# Patient Record
Sex: MALE | Race: BLACK OR AFRICAN AMERICAN | NOT HISPANIC OR LATINO | Employment: FULL TIME | URBAN - METROPOLITAN AREA
[De-identification: names, ages, dates, MRNs, and addresses within clinical notes are randomized per-mention and may not be internally consistent; named-entity substitution may affect disease eponyms.]

---

## 2018-07-12 ENCOUNTER — HOSPITAL ENCOUNTER (EMERGENCY)
Facility: HOSPITAL | Age: 58
Discharge: HOME | End: 2018-07-12
Attending: EMERGENCY MEDICINE | Admitting: EMERGENCY MEDICINE

## 2018-07-12 VITALS
HEIGHT: 69 IN | BODY MASS INDEX: 46.65 KG/M2 | HEART RATE: 95 BPM | DIASTOLIC BLOOD PRESSURE: 108 MMHG | OXYGEN SATURATION: 98 % | WEIGHT: 315 LBS | TEMPERATURE: 98.5 F | RESPIRATION RATE: 18 BRPM | SYSTOLIC BLOOD PRESSURE: 165 MMHG

## 2018-07-12 DIAGNOSIS — S39.012A ACUTE MYOFASCIAL STRAIN OF LUMBOSACRAL REGION, INITIAL ENCOUNTER: Primary | ICD-10-CM

## 2018-07-12 PROCEDURE — 99282 EMERGENCY DEPT VISIT SF MDM: CPT | Performed by: EMERGENCY MEDICINE

## 2018-07-12 PROCEDURE — 99282 EMERGENCY DEPT VISIT SF MDM: CPT

## 2018-07-12 RX ORDER — CYCLOBENZAPRINE HCL 10 MG
TABLET ORAL
Qty: 20 TABLET | Refills: 0 | Status: SHIPPED | OUTPATIENT
Start: 2018-07-12 | End: 2020-02-17

## 2018-07-12 RX ORDER — LIDOCAINE 50 MG/G
PATCH TOPICAL
Qty: 5 PATCH | Refills: 0 | Status: SHIPPED | OUTPATIENT
Start: 2018-07-12 | End: 2020-02-17

## 2018-07-12 NOTE — ED PROVIDER NOTES
EMERGENCY DEPARTMENT ENCOUNTER      Room Number: 1B/1B      HPI:    Chief complaint: Acute low back pain    Location: Across lower back and into right but talk    Quality/Severity:  Moderate    Timing/Duration: Abrupt onset at 1830    Modifying Factors: Worse with certain movement    Associated Symptoms: No focal numbness or weakness, incontinence or hematuria.    Narrative: Pt is a 57 y.o. male who presents complaining of acute low back pain after reaching abruptly to catch a falling infant in his carrier.  Occurred at work.  No blunt trauma to the back      PMD: Miriam Mercado Reyes, MD    REVIEW OF SYSTEMS  Review of Systems  All other systems reviewed are otherwise negative as related chief complaint  PAST MEDICAL HISTORY  Active Ambulatory Problems     Diagnosis Date Noted   • No Active Ambulatory Problems     Resolved Ambulatory Problems     Diagnosis Date Noted   • No Resolved Ambulatory Problems     Past Medical History:   Diagnosis Date   • Diabetes (CMS/[facility])    • Hypertension        PAST SURGICAL HISTORY  No past surgical history on file.    FAMILY HISTORY  Family History   Problem Relation Age of Onset   • Hypertension Mother    • Diabetes Mother    • Hypertension Father    • Diabetes Father        SOCIAL HISTORY  Social History     Social History   • Marital status: Unknown     Spouse name: N/A   • Number of children: N/A   • Years of education: N/A     Occupational History   • Not on file.     Social History Main Topics   • Smoking status: Never Smoker   • Smokeless tobacco: Never Used   • Alcohol use Yes      Comment: OCC   • Drug use: No   • Sexual activity: Defer     Other Topics Concern   • Not on file     Social History Narrative   • No narrative on file       ALLERGIES  Penicillins    PHYSICAL EXAM  ED Triage Vitals [07/12/18 0124]   Temp Heart Rate Resp BP SpO2   98.5 °F (36.9 °C) 95 18 (!) 165/108 98 %      Temp src Heart Rate Source Patient Position BP Location FiO2 (%)   Oral Monitor Sitting  Left arm --         Physical Exam   Constitutional: He is oriented to person, place, and time and well-developed, well-nourished, and in no distress. No distress.   Cardiovascular: Normal rate and intact distal pulses.    Pulmonary/Chest: Effort normal and breath sounds normal. No respiratory distress.   Abdominal: Soft. There is no tenderness.   Musculoskeletal:        Back:    Moves all extremities equally.  No focal numbness or weakness reported.  Stable gait.   Neurological: He is alert and oriented to person, place, and time. Gait normal. GCS score is 15.   Skin: Skin is warm and dry.   Psychiatric: Mood and affect normal.   Vitals reviewed.      LAB RESULTS  No results found for this or any previous visit.      I ordered the above labs and reviewed the results    RADIOLOGY  No results found.    I ordered the above radiologic testing and reviewed the results    PROCEDURES  Procedures      PROGRESS AND CONSULTS           MEDICAL DECISION MAKING  Results were reviewed/discussed with the patient and they were also made aware of online access. Pt also made aware that some labs, such as cultures, will not be resulted during ER visit and follow up with PMD is necessary.     MDM       DIAGNOSIS  Final diagnoses:   Acute myofascial strain of lumbosacral region, initial encounter       Latest Documented Vital Signs:  As of 1:38 AM  BP- (!) 165/108 HR- 95 Temp- 98.5 °F (36.9 °C) (Oral) O2 sat- 98%    DISPOSITION  Discharged in stable condition       Medication List      New Prescriptions    cyclobenzaprine 10 MG tablet  Commonly known as:  FLEXERIL  Take one tablet by mouth up to 3 times daily as needed for muscle spasms     lidocaine 5 %  Commonly known as:  LIDODERM  Place 1 patch on the skin every day overlying the area of discomfort.   Remove & Discard patch within 12 hours          Follow-up Information     Miriam Mercado Reyes, MD. Schedule an appointment as soon as possible for a visit in 1 week.    Specialty:   Family Medicine  Why:  As needed, If symptoms fail to improve  Contact information:  [address] [facility] [address]  [phone]                        Av Lubin MD  07/12/18 0139

## 2020-02-17 ENCOUNTER — APPOINTMENT (OUTPATIENT)
Dept: GENERAL RADIOLOGY | Facility: HOSPITAL | Age: 60
End: 2020-02-17

## 2020-02-17 ENCOUNTER — HOSPITAL ENCOUNTER (EMERGENCY)
Facility: HOSPITAL | Age: 60
Discharge: HOME | End: 2020-02-17
Attending: EMERGENCY MEDICINE | Admitting: EMERGENCY MEDICINE

## 2020-02-17 VITALS
BODY MASS INDEX: 46.65 KG/M2 | OXYGEN SATURATION: 97 % | HEIGHT: 69 IN | TEMPERATURE: 98.1 F | WEIGHT: 315 LBS | HEART RATE: 92 BPM | SYSTOLIC BLOOD PRESSURE: 160 MMHG | DIASTOLIC BLOOD PRESSURE: 85 MMHG | RESPIRATION RATE: 20 BRPM

## 2020-02-17 DIAGNOSIS — S63.502A SPRAIN OF LEFT WRIST, INITIAL ENCOUNTER: Primary | ICD-10-CM

## 2020-02-17 DIAGNOSIS — S39.012A STRAIN OF LUMBAR REGION, INITIAL ENCOUNTER: ICD-10-CM

## 2020-02-17 DIAGNOSIS — S80.02XA CONTUSION OF LEFT KNEE, INITIAL ENCOUNTER: ICD-10-CM

## 2020-02-17 PROCEDURE — 72110 X-RAY EXAM L-2 SPINE 4/>VWS: CPT

## 2020-02-17 PROCEDURE — 73562 X-RAY EXAM OF KNEE 3: CPT

## 2020-02-17 PROCEDURE — 99284 EMERGENCY DEPT VISIT MOD MDM: CPT

## 2020-02-17 PROCEDURE — 99282 EMERGENCY DEPT VISIT SF MDM: CPT | Performed by: PHYSICIAN ASSISTANT

## 2020-02-17 PROCEDURE — 73110 X-RAY EXAM OF WRIST: CPT

## 2020-02-17 PROCEDURE — 73130 X-RAY EXAM OF HAND: CPT

## 2020-02-17 RX ORDER — OMEPRAZOLE 20 MG/1
20 CAPSULE, DELAYED RELEASE ORAL DAILY
COMMUNITY
End: 2021-12-03

## 2020-02-17 RX ORDER — ACETAMINOPHEN 500 MG
1000 TABLET ORAL ONCE
Status: COMPLETED | OUTPATIENT
Start: 2020-02-17 | End: 2020-02-17

## 2020-02-17 RX ORDER — FUROSEMIDE 40 MG/1
40 TABLET ORAL 2 TIMES DAILY
COMMUNITY
End: 2021-05-20

## 2020-02-17 RX ORDER — CHLORAL HYDRATE 500 MG
CAPSULE ORAL
COMMUNITY

## 2020-02-17 RX ADMIN — ACETAMINOPHEN 1000 MG: 500 TABLET, FILM COATED ORAL at 14:29
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
## 2020-08-25 ENCOUNTER — HOSPITAL ENCOUNTER (OUTPATIENT)
Dept: PHYSICAL THERAPY | Facility: HOSPITAL | Age: 60
Setting detail: THERAPIES SERIES
Discharge: HOME | End: 2020-08-25

## 2020-08-25 DIAGNOSIS — S83.91XD SPRAIN OF RIGHT KNEE, UNSPECIFIED LIGAMENT, SUBSEQUENT ENCOUNTER: Primary | ICD-10-CM

## 2020-08-25 PROCEDURE — G0283 ELEC STIM OTHER THAN WOUND: HCPCS | Performed by: PHYSICAL THERAPIST

## 2020-08-25 PROCEDURE — 97110 THERAPEUTIC EXERCISES: CPT | Performed by: PHYSICAL THERAPIST

## 2020-08-25 PROCEDURE — 97162 PT EVAL MOD COMPLEX 30 MIN: CPT | Performed by: PHYSICAL THERAPIST

## 2020-08-25 NOTE — THERAPY EVALUATION
Outpatient Physical Therapy Ortho Initial Evaluation  DINESH Carrasco     Patient Name: Diego Ribeiro  : 1960  MRN: [id number]  Today's Date: 2020      Visit Date: 2020    There is no problem list on file for this patient.       Past Medical History:   Diagnosis Date   • Diabetes (CMS/HCC)    • Hypertension         History reviewed. No pertinent surgical history.    Visit Dx:     ICD-10-CM ICD-9-CM   1. Sprain of right knee, unspecified ligament, subsequent encounter S83.91XD V58.89     844.9         Patient History     Row Name 20 0900             History    Chief Complaint  Pain  -SP      Type of Pain  Knee pain  -SP      Date Current Problem(s) Began  20  -SP      Brief Description of Current Complaint  Patient injured right knee when he was working on a dock at work while going to step up.  He states he hit hands first then knees.  Patient has had ongoing pain and difficulty with weight bearing activity.  He had x-ray of right knee with no abnormalities. Patient reports that he has continued to have pain in medial aspect of knee with buckling.  He was given a knee support and states that he is unable to walk without it.    -SP      Previous treatment for THIS PROBLEM  Medication knee sleeve  -SP      Patient/Caregiver Goals  Relieve pain;Return to work;Improve mobility  -SP      Patient's Rating of General Health  Good  -SP      Occupation/sports/leisure activities  Works full time at [employer].  Requires prolonged stand and walking on unlevel surfaces and occasional stairs.   -SP      How has patient tried to help current problem?  lidocaine patch, knee support, naproxen  -SP      What clinical tests have you had for this problem?  X-ray  -SP         Pain     Pain Location  Knee  -SP      Pain at Present  4  -SP      Pain at Best  0  -SP      Pain at Worst  10  -SP      Pain Frequency  Constant/continuous occasional period with no pain  -SP      Pain Description   Shooting;Sharp;Throbbing  -SP      What Performance Factors Make the Current Problem(s) WORSE?  weight bearing, prolonged walking, standing, stairs, transfers   -SP      What Performance Factors Make the Current Problem(s) BETTER?  rest, ice, meds, lidocaine patch.   -SP      Tolerance Time- Standing  limited tolerance; pain upon initial stand  -SP      Tolerance Time- Sitting  becomes uncomfortable with prolonged sit  -SP      Tolerance Time- Walking  difficulty tolerating prolonged   -SP      Is your sleep disturbed?  Yes  -SP      Difficulties at work?  currently unable to perform work duties  -SP      Difficulties with ADL's?  difficutly with transfers, gait especially on unlevel surfaces, limited tolerance for weight bearing ADLs  -SP         Fall Risk Assessment    Any falls in the past year:  Yes  -SP         Daily Activities    Primary Language  English  -SP      How does patient learn best?  Listening;Reading  -SP      Teaching needs identified  Home Exercise Program;Management of Condition  -SP      Patient is concerned about/has problems with  Transfers (getting out of a chair, bed);Walking;Standing  -SP      Barriers to learning  None  -SP      Pt Participated in POC and Goals  Yes  -SP         Safety    Are you being hurt, hit, or frightened by anyone at home or in your life?  No  -SP      Are you being neglected by a caregiver  No  -SP        User Key  (r) = Recorded By, (t) = Taken By, (c) = Cosigned By    Initials Name Provider Type    SP Maye Cabrera, PT Physical Therapist          PT Ortho     Row Name 08/25/20 0900       Posture/Observations    Observations  Edema  -SP    Posture/Observations Comments  moderate edema right knee anterior and along joint line:  with with knee sleev yadiel  -SP       Knee Palpation    Patella  Right:;Tender;Swollen  -SP    Patella Tendon  Right:;Tender;Swollen  -SP    Prepatellar Bursa  Right:;Tender;Swollen  -SP    Suprapatella Bursa   Right:;Tender;Swollen  -SP    Medial Joint Line  Right:;Tender;Swollen  -SP    Lateral Joint Line  Right:;Tender;Swollen  -SP    Posterior Joint Line  Right:;Tender  -SP    Quads  Right:;Tender  -SP    Biceps Femoris  Right:;Tender  -SP    Semimembranosis  Right:;Tender  -SP       Patellar Accessory Motions    Superior glide  Right:;Hypomobile  -SP    Inferior glide  Right:;Hypomobile  -SP    Medial glide  Right:;Hypomobile  -SP    Lateral glide  Right:;Hypomobile  -SP       Knee Special Tests    Knee Special Tests Comments  patient guarded with attempts to perform special tests  -SP       General ROM    GENERAL ROM COMMENTS  right hip and ankle AROM wfl  -SP       Right Lower Ext    Rt Knee Extension/Flexion AROM  10 to 104 degrees with pain at end ranges  -SP       MMT Right Lower Ext    Rt Hip Flexion MMT, Gross Movement  (4-/5) good minus  -SP    Rt Hip ABduction MMT, Gross Movement  (4-/5) good minus  -SP    Rt Hip ADduction MMT, Gross Movement  (4-/5) good minus  -SP    Rt Knee Extension MMT, Gross Movement  (3/5) fair  -SP    Rt Knee Flexion MMT, Gross Movement  (3+/5) fair plus  -SP    Rt Lower Extremity Comments   pain limited strength tests  -SP       Sensation    Sensation WNL?  WFL  -SP       Lower Extremity Flexibility    Hamstrings  Right:;Moderately limited  -SP    Hip Flexors  Right:;Moderately limited  -SP    Gastrocnemius  Right:;Moderately limited  -SP       RLE Quick Girth (cm)    Tib tuberosity  46.7 cm  -SP    Mid patella  51 cm  -SP    Distal thigh  54.3 cm  -SP    RLE Quick Girth Total  152  -SP       Balance Skills Training    SLS  unable to single leg stand on right without UE support  -SP       Transfers    Bed-Chair Apex (Transfers)  independent  -SP    Chair-Bed Apex (Transfers)  independent  -SP    Sit-Stand Apex (Transfers)  independent  -SP    Stand-Sit Apex (Transfers)  independent  -SP    Comment (Transfers)  Patient observed to have antalgic  transfers sit to stand with use of UEs to assist   -SP       Gait/Stairs Assessment/Training    [place] Level (Gait)  independent  -SP    Pattern (Gait)  swing-through  -SP    Deviations/Abnormal Patterns (Gait)  antalgic;gait speed decreased;stride length decreased  -SP    Bilateral Gait Deviations  forward flexed posture  -SP    Right Sided Gait Deviations  heel strike decreased right knee remains slightly flexed throughout   -SP    Ascending Technique (Stairs)  step-over-step  -SP    Descending Technique (Stairs)  step-over-step  -SP    Comment (Gait/Stairs)  requires hold to rail with stairs  -SP      User Key  (r) = Recorded By, (t) = Taken By, (c) = Cosigned By    Initials Name Provider Type    Maye Barnes, PT Physical Therapist                      Therapy Education  Given: HEP  Program: New  How Provided: Verbal, Written  Provided to: Patient  Level of Understanding: Verbalized, Demonstrated     PT OP Goals     Row Name 08/25/20 0900          PT Short Term Goals    STG Date to Achieve  09/24/20  -SP     STG 1  Patient demonstrates right knee extension to 0 degrees to allow heel strke with gait  -SP     STG 2  Patient ambulates level surfaces with normal gait pattern and equal weight bearing bilaterally  -SP     STG 3  Patient exhibits decreased edema/girth measures by 0.5 cm to improve mobility and decrease pain,  -SP        Long Term Goals    LTG Date to Achieve  09/24/20  -SP     LTG 1  Patient demonstrates right knee AROM 0 to 115 degrees without complaints of pain at end ranges  -SP     LTG 2  Patient demonstrates increased strength right LE by one muscle grade to better tolerate ADLs  -SP     LTG 3  Patient tolerates work duties with pain level <2/10 at worst   -SP     LTG 4  Patient independent with HEP  -SP        Time Calculation    PT Goal Re-Cert Due Date  09/24/20  -SP       User Key  (r) = Recorded By, (t) = Taken By, (c) = Cosigned By    Initials Name Provider Type    SP  Maye Cabrera, PT Physical Therapist          PT Assessment/Plan     Row Name 08/25/20 1359          PT Assessment    Functional Limitations  Impaired gait;Limitation in home management;Limitations in community activities;Performance in work activities;Performance in self-care ADL;Performance in leisure activities  -SP     Impairments  Gait;Edema;Endurance;Range of motion;Pain;Muscle strength;Impaired flexibility  -SP     Assessment Comments  Patient injured when he fell onto knees while working at [employer] 6-30-20, resulting in right knee pain and reports of instability.  Patient presents with pain, decreased ROM, edema, pain limited weakness, decreased tolerance for weight bearing and impaired functional ability to perfrom home, work, and community ADLsl  -SP     Please refer to paper survey for additional self-reported information  Yes  -SP     Rehab Potential  Good  -SP     Patient/caregiver participated in establishment of treatment plan and goals  Yes  -SP     Patient would benefit from skilled therapy intervention  Yes  -SP        PT Plan    PT Frequency  2x/week  -SP     Predicted Duration of Therapy Intervention (Therapy Eval)  2-3 weeks  -SP     Planned CPT's?  PT EVAL MOD COMPLELITY: 62407;PT MANUAL THERAPY EA 15 MIN: 49532;PT HOT OR COLD PACK TREAT MCARE;PT HOT/COLD PACK WC NONMCARE: 61542;PT THER PROC EA 15 MIN: 96617;PT ELECTRICAL STIM UNATTEND:   -SP       User Key  (r) = Recorded By, (t) = Taken By, (c) = Cosigned By    Initials Name Provider Type    SP Maye Cabrera, PT Physical Therapist          Modalities     Row Name 08/25/20 0900             Ice    Ice Applied  Yes with IFC  -SP      Location  right knee  -SP      Rx Minutes  15 mins  -SP      Ice S/P Rx  Yes  -SP         ELECTRICAL STIMULATION    Attended/Unattended  Unattended  -SP      Stimulation Type  IFC  -SP      Location/Electrode Placement/Other  right knee  -SP        User Key  (r) = Recorded By, (t) =  Taken By, (c) = Cosigned By    Initials Name Provider Type    Maye Barnes, HILARIO Physical Therapist        OP Exercises     Row Name 08/25/20 0900             Exercise 1    Exercise Name 1  quad set on towel roll  -SP      Cueing 1  Verbal;Tactile  -SP      Reps 1  10   -SP      Time 1  5 sec  -SP         Exercise 2    Exercise Name 2  SLR  -SP      Cueing 2  Verbal;Tactile  -SP      Reps 2  15  -SP         Exercise 3    Exercise Name 3  SAQ  -SP      Cueing 3  Verbal;Tactile  -SP      Reps 3  20  -SP         Exercise 4    Exercise Name 4  hamstring stretch  -SP      Cueing 4  Verbal;Tactile  -SP      Reps 4  3  -SP      Time 4  20 sec  -SP        User Key  (r) = Recorded By, (t) = Taken By, (c) = Cosigned By    Initials Name Provider Type    Maye Barnes, HILARIO Physical Therapist                        Outcome Measure Options: Lower Extremity Functional Scale (LEFS)  Lower Extremity Functional Index  Any of your usual work, housework or school activities: Quite a bit of difficulty  Your usual hobbies, recreational or sporting activities: Quite a bit of difficulty  Getting into or out of the bath: Moderate difficulty  Walking between rooms: Moderate difficulty  Putting on your shoes or socks: Moderate difficulty  Squatting: Quite a bit of difficulty  Lifting an object, like a bag of groceries from the floor: Moderate difficulty  Performing light activities around your home: Moderate difficulty  Performing heavy activities around your home: Quite a bit of difficulty  Getting into or out of a car: Moderate difficulty  Walking 2 blocks: Quite a bit of difficulty  Walking a mile: Quite a bit of difficulty  Going up or down 10 stairs (about 1 flight of stairs): Moderate difficulty  Standing for 1 hour: Quite a bit of difficulty  Sitting for 1 hour: A little bit of difficulty  Running on even ground: Quite a bit of difficulty  Running on uneven ground: Quite a bit of difficulty  Making sharp turns  while running fast: Quite a bit of difficulty  Hopping: Quite a bit of difficulty  Rolling over in bed: Moderate difficulty  Total: 30      Time Calculation:     Start Time: 0900  Stop Time: 1000  Time Calculation (min): 60 min     Therapy Charges for Today     Code Description Service Date Service Provider Modifiers Qty    32715440869 HC PT ELECTRICAL STIM UNATTENDED 8/25/2020 Maye Cabrera, PT  1    94226646268 HC PT THER PROC EA 15 MIN 8/25/2020 Maye Cabrera, PT GP 1    74190421523 HC PT EVAL MOD COMPLEXITY 2 8/25/2020 Maye Cabrera, PT GP 1          PT G-Codes  Outcome Measure Options: Lower Extremity Functional Scale (LEFS)  Total: 30         Maye Cabrera, PT  8/25/2020

## 2020-08-27 ENCOUNTER — HOSPITAL ENCOUNTER (OUTPATIENT)
Dept: PHYSICAL THERAPY | Facility: HOSPITAL | Age: 60
Setting detail: THERAPIES SERIES
Discharge: HOME | End: 2020-08-27

## 2020-08-27 DIAGNOSIS — S83.91XD SPRAIN OF RIGHT KNEE, UNSPECIFIED LIGAMENT, SUBSEQUENT ENCOUNTER: Primary | ICD-10-CM

## 2020-08-27 PROCEDURE — 97110 THERAPEUTIC EXERCISES: CPT | Performed by: PHYSICAL THERAPIST

## 2020-08-27 PROCEDURE — G0283 ELEC STIM OTHER THAN WOUND: HCPCS | Performed by: PHYSICAL THERAPIST

## 2020-08-27 NOTE — THERAPY TREATMENT NOTE
Outpatient Physical Therapy Ortho Treatment Note  DINESH Carrasco     Patient Name: Diego Ribeiro  : 1960  MRN: [id number]  Today's Date: 2020      Visit Date: 2020    Visit Dx:    ICD-10-CM ICD-9-CM   1. Sprain of right knee, unspecified ligament, subsequent encounter S83.91XD V58.89     844.9       There is no problem list on file for this patient.       Past Medical History:   Diagnosis Date   • Diabetes (CMS/HCC)    • Hypertension         No past surgical history on file.    PT Ortho     Row Name 20 0708       Subjective Comments    Subjective Comments  Patient reports that he had mild soreness after last visit.  He has tried exercises at home.   -SP      User Key  (r) = Recorded By, (t) = Taken By, (c) = Cosigned By    Initials Name Provider Type    Maye Barnes, PT Physical Therapist                      PT Assessment/Plan     Row Name 20 0994          PT Assessment    Assessment Comments  Patient with fair tolerance for progression of ther-ex.  He does report increased pain with exercise but does have some relief of symptoms with modalities  -SP        PT Plan    PT Plan Comments  Continue to progress as tolerable   -SP       User Key  (r) = Recorded By, (t) = Taken By, (c) = Cosigned By    Initials Name Provider Type    Maey Barnes, PT Physical Therapist          Modalities     Row Name 20 0708             Ice    Ice Applied  Yes IFC  -SP      Location  right knee  -SP      Rx Minutes  15 mins  -SP      Ice S/P Rx  Yes  -SP         ELECTRICAL STIMULATION    Attended/Unattended  Unattended  -SP      Stimulation Type  IFC  -SP      Location/Electrode Placement/Other  right knee  -SP        User Key  (r) = Recorded By, (t) = Taken By, (c) = Cosigned By    Initials Name Provider Type    Maye Barnes, PT Physical Therapist        OP Exercises     Row Name 20 0733             Subjective Comments    Subjective Comments  Patient  reports that he had mild soreness after last visit.  He has tried exercises at home.   -SP         Exercise 1    Exercise Name 1  quad set on towel roll  -SP      Cueing 1  Verbal;Tactile  -SP      Reps 1  10   -SP      Time 1  5 sec  -SP         Exercise 2    Exercise Name 2  SLR  -SP      Cueing 2  Verbal;Tactile  -SP      Reps 2  20  -SP         Exercise 3    Exercise Name 3  SAQ  -SP      Cueing 3  Verbal;Tactile  -SP      Reps 3  20  -SP         Exercise 4    Exercise Name 4  hamstring stretch  -SP      Cueing 4  Verbal;Tactile  -SP      Reps 4  3  -SP      Time 4  20 sec  -SP         Exercise 5    Exercise Name 5  sidelying hip abduction  -SP      Cueing 5  Verbal;Tactile  -SP      Reps 5  20  -SP         Exercise 6    Exercise Name 6  hip adduction/ball squeeze  -SP      Cueing 6  Verbal;Tactile  -SP      Reps 6  20  -SP      Time 6  5 sec  -SP         Exercise 7    Exercise Name 7  recumbent bike (seat 9)  -SP      Cueing 7  Verbal  -SP      Time 7  5 min  -SP        User Key  (r) = Recorded By, (t) = Taken By, (c) = Cosigned By    Initials Name Provider Type    Maye Barnes, PT Physical Therapist                           Therapy Education  Given: HEP  Program: Reinforced, Progressed  How Provided: Verbal, Written  Provided to: Patient  Level of Understanding: Verbalized, Demonstrated              Time Calculation:   Start Time: 0708  Stop Time: 0805  Time Calculation (min): 57 min  Therapy Charges for Today     Code Description Service Date Service Provider Modifiers Qty    51975972045 HC PT ELECTRICAL STIM UNATTENDED 8/27/2020 Maye Cabrera, PT  1    91053352769 HC PT THER PROC EA 15 MIN 8/27/2020 Maye Cabrera, PT GP 2                    Maye Cabrera, PT  8/27/2020

## 2020-08-31 ENCOUNTER — HOSPITAL ENCOUNTER (OUTPATIENT)
Dept: PHYSICAL THERAPY | Facility: HOSPITAL | Age: 60
Setting detail: THERAPIES SERIES
Discharge: HOME | End: 2020-08-31

## 2020-08-31 DIAGNOSIS — S83.91XD SPRAIN OF RIGHT KNEE, UNSPECIFIED LIGAMENT, SUBSEQUENT ENCOUNTER: Primary | ICD-10-CM

## 2020-08-31 PROCEDURE — G0283 ELEC STIM OTHER THAN WOUND: HCPCS | Performed by: PHYSICAL THERAPIST

## 2020-08-31 PROCEDURE — 97110 THERAPEUTIC EXERCISES: CPT | Performed by: PHYSICAL THERAPIST

## 2020-09-08 ENCOUNTER — HOSPITAL ENCOUNTER (OUTPATIENT)
Dept: PHYSICAL THERAPY | Facility: HOSPITAL | Age: 60
Setting detail: THERAPIES SERIES
Discharge: HOME | End: 2020-09-08

## 2020-09-08 DIAGNOSIS — S83.91XD SPRAIN OF RIGHT KNEE, UNSPECIFIED LIGAMENT, SUBSEQUENT ENCOUNTER: Primary | ICD-10-CM

## 2020-09-08 PROCEDURE — G0283 ELEC STIM OTHER THAN WOUND: HCPCS | Performed by: PHYSICAL THERAPIST

## 2020-09-08 PROCEDURE — 97110 THERAPEUTIC EXERCISES: CPT | Performed by: PHYSICAL THERAPIST

## 2020-09-08 NOTE — THERAPY TREATMENT NOTE
Outpatient Physical Therapy Ortho Treatment Note  DINESH Carrasco     Patient Name: Diego Ribeiro  : 1960  MRN: [id number]  Today's Date: 2020      Visit Date: 2020    Visit Dx:    ICD-10-CM ICD-9-CM   1. Sprain of right knee, unspecified ligament, subsequent encounter S83.91XD V58.89     844.9       There is no problem list on file for this patient.       Past Medical History:   Diagnosis Date   • Diabetes (CMS/HCC)    • Hypertension         No past surgical history on file.    PT Ortho     Row Name 20 0700       Subjective Comments    Subjective Comments  Patient reports that his knee is no better and actually seems worse.  He states that he is noticing more bruising and he is not sure if it is from his brace.  Patient reports symptoms have definitely been worse over the last few days.    -SP      User Key  (r) = Recorded By, (t) = Taken By, (c) = Cosigned By    Initials Name Provider Type    Maye Barnes, PT Physical Therapist                      PT Assessment/Plan     Row Name 20 0802          PT Assessment    Assessment Comments  Patient continues to have elevated pain level and reports instability when not wearing sleeve  -SP        PT Plan    PT Plan Comments  Continue to progress as tolerable  -SP       User Key  (r) = Recorded By, (t) = Taken By, (c) = Cosigned By    Initials Name Provider Type    Maye Barnes, PT Physical Therapist          Modalities     Row Name 20 0700             Ice    Ice Applied  Yes  -SP      Location  right knee  -SP      Rx Minutes  15 mins  -SP      Ice S/P Rx  Yes  -SP         ELECTRICAL STIMULATION    Attended/Unattended  Unattended  -SP      Stimulation Type  IFC  -SP      Location/Electrode Placement/Other  right knee  -SP        User Key  (r) = Recorded By, (t) = Taken By, (c) = Cosigned By    Initials Name Provider Type    Maye Barnes, PT Physical Therapist        OP Exercises     Row Name  09/08/20 0700             Subjective Comments    Subjective Comments  Patient reports that his knee is no better and actually seems worse.  He states that he is noticing more bruising and he is not sure if it is from his brace.  Patient reports symptoms have definitely been worse over the last few days.    -SP         Exercise 1    Exercise Name 1  quad set on towel roll  -SP      Cueing 1  Verbal;Tactile  -SP      Reps 1  20  -SP      Time 1  5 sec  -SP         Exercise 2    Exercise Name 2  SLR  -SP      Cueing 2  Verbal;Tactile  -SP      Reps 2  25  -SP         Exercise 3    Exercise Name 3  SAQ  -SP      Cueing 3  Verbal;Tactile  -SP      Reps 3  25  -SP         Exercise 4    Exercise Name 4  hamstring stretch  -SP      Cueing 4  Verbal;Tactile  -SP      Reps 4  3  -SP      Time 4  20 sec  -SP         Exercise 5    Exercise Name 5  sidelying hip abduction  -SP      Cueing 5  Verbal;Tactile  -SP      Reps 5  25  -SP         Exercise 6    Exercise Name 6  hip adduction/ball squeeze  -SP      Cueing 6  Verbal;Tactile  -SP      Reps 6  25  -SP      Time 6  5 sec  -SP         Exercise 7    Exercise Name 7  recumbent bike (seat 9)  -SP      Cueing 7  Verbal  -SP      Time 7  7 min  -SP        User Key  (r) = Recorded By, (t) = Taken By, (c) = Cosigned By    Initials Name Provider Type    Maye Barnes, HILARIO Physical Therapist                           Therapy Education  Given: HEP  Program: Reinforced  How Provided: Verbal  Provided to: Patient  Level of Understanding: Verbalized, Demonstrated              Time Calculation:   Start Time: 0720  Stop Time: 0816  Time Calculation (min): 56 min  Therapy Charges for Today     Code Description Service Date Service Provider Modifiers Qty    33324813874 HC PT THER PROC EA 15 MIN 9/8/2020 Maye Cabrera, PT GP 1    11185782380 HC PT ELECTRICAL STIM UNATTENDED 9/8/2020 Maye Cabrera, PT  1                    Maye Cabrera,  PT  9/8/2020

## 2020-09-09 ENCOUNTER — TRANSCRIBE ORDERS (OUTPATIENT)
Dept: ADMINISTRATIVE | Facility: HOSPITAL | Age: 60
End: 2020-09-09

## 2020-09-09 DIAGNOSIS — M25.561 RIGHT KNEE PAIN, UNSPECIFIED CHRONICITY: Primary | ICD-10-CM

## 2020-09-14 ENCOUNTER — HOSPITAL ENCOUNTER (OUTPATIENT)
Dept: PHYSICAL THERAPY | Facility: HOSPITAL | Age: 60
Setting detail: THERAPIES SERIES
Discharge: HOME | End: 2020-09-14

## 2020-09-14 DIAGNOSIS — S83.91XD SPRAIN OF RIGHT KNEE, UNSPECIFIED LIGAMENT, SUBSEQUENT ENCOUNTER: Primary | ICD-10-CM

## 2020-09-14 PROCEDURE — G0283 ELEC STIM OTHER THAN WOUND: HCPCS | Performed by: PHYSICAL THERAPIST

## 2020-09-14 PROCEDURE — 97110 THERAPEUTIC EXERCISES: CPT | Performed by: PHYSICAL THERAPIST

## 2020-09-14 NOTE — THERAPY TREATMENT NOTE
Outpatient Physical Therapy Ortho Treatment Note  DINESH Carrasco     Patient Name: Diego Ribeiro  : 1960  MRN: [id number]  Today's Date: 2020      Visit Date: 2020    Visit Dx:    ICD-10-CM ICD-9-CM   1. Sprain of right knee, unspecified ligament, subsequent encounter  S83.91XD V58.89     844.9       There is no problem list on file for this patient.       Past Medical History:   Diagnosis Date   • Diabetes (CMS/HCC)    • Hypertension         No past surgical history on file.    PT Ortho     Row Name 20 07       Subjective Comments    Subjective Comments  Patient reports that he is having less pain and discomfort in right knee.    -SP      User Key  (r) = Recorded By, (t) = Taken By, (c) = Cosigned By    Initials Name Provider Type    Maye Barnes, PT Physical Therapist                      PT Assessment/Plan     Row Name 20 0745          PT Assessment    Assessment Comments  Patient with decreasing symptoms.  He tolerates progression of ther-ex well without complaints of increased pain.  -SP        PT Plan    PT Plan Comments  Continue to progress per POC  -SP       User Key  (r) = Recorded By, (t) = Taken By, (c) = Cosigned By    Initials Name Provider Type    Maye Barnes, PT Physical Therapist          Modalities     Row Name 20             Ice    Ice Applied  Yes  -SP      Location  right knee  -SP      Rx Minutes  15 mins  -SP      Ice S/P Rx  Yes  -SP         ELECTRICAL STIMULATION    Attended/Unattended  Unattended  -SP      Stimulation Type  IFC  -SP      Location/Electrode Placement/Other  right knee  -SP        User Key  (r) = Recorded By, (t) = Taken By, (c) = Cosigned By    Initials Name Provider Type    Maye Barnes, PT Physical Therapist        OP Exercises     Row Name 20 07             Subjective Comments    Subjective Comments  Patient reports that he is having less pain and discomfort in right knee.     -SP         Exercise 1    Exercise Name 1  quad set on towel roll  -SP      Cueing 1  Verbal;Tactile  -SP      Reps 1  20  -SP      Time 1  5 sec  -SP         Exercise 2    Exercise Name 2  SLR  -SP      Cueing 2  Verbal;Tactile  -SP      Reps 2  30  -SP         Exercise 3    Exercise Name 3  SAQ  -SP      Cueing 3  Verbal;Tactile  -SP      Reps 3  30  -SP         Exercise 4    Exercise Name 4  hamstring stretch  -SP      Cueing 4  Verbal;Tactile  -SP      Reps 4  3  -SP      Time 4  20 sec  -SP         Exercise 5    Exercise Name 5  sidelying hip abduction  -SP      Cueing 5  Verbal;Tactile  -SP      Reps 5  30  -SP         Exercise 6    Exercise Name 6  hip adduction/ball squeeze  -SP      Cueing 6  Verbal;Tactile  -SP      Reps 6  30  -SP      Time 6  5 sec  -SP         Exercise 7    Exercise Name 7  recumbent bike (seat 9)  -SP      Cueing 7  Verbal  -SP      Time 7  7 min  -SP         Exercise 8    Exercise Name 8  LAQ  -SP      Cueing 8  Verbal;Demo  -SP      Reps 8  20  -SP        User Key  (r) = Recorded By, (t) = Taken By, (c) = Cosigned By    Initials Name Provider Type    SP Maye Cabrera, PT Physical Therapist                           Therapy Education  Given: HEP  Program: Reinforced, Progressed  How Provided: Verbal  Provided to: Patient  Level of Understanding: Verbalized, Demonstrated              Time Calculation:   Start Time: 0718  Stop Time: 0806  Time Calculation (min): 48 min  Therapy Charges for Today     Code Description Service Date Service Provider Modifiers Qty    66428034657 HC PT ELECTRICAL STIM UNATTENDED 9/14/2020 Maye Cabrera, PT  1    28198648148 HC PT THER PROC EA 15 MIN 9/14/2020 Maye Cabrera, PT GP 1                    Maye Cabrera, PT  9/14/2020

## 2020-09-15 ENCOUNTER — HOSPITAL ENCOUNTER (OUTPATIENT)
Dept: MRI IMAGING | Facility: HOSPITAL | Age: 60
Discharge: HOME | End: 2020-09-15
Admitting: NURSE PRACTITIONER

## 2020-09-15 DIAGNOSIS — M25.561 RIGHT KNEE PAIN, UNSPECIFIED CHRONICITY: ICD-10-CM

## 2020-09-15 PROCEDURE — 73721 MRI JNT OF LWR EXTRE W/O DYE: CPT

## 2020-09-28 ENCOUNTER — OFFICE VISIT (OUTPATIENT)
Dept: ORTHOPEDIC SURGERY | Facility: CLINIC | Age: 60
End: 2020-09-28

## 2020-09-28 VITALS
HEART RATE: 90 BPM | DIASTOLIC BLOOD PRESSURE: 83 MMHG | SYSTOLIC BLOOD PRESSURE: 126 MMHG | BODY MASS INDEX: 46.65 KG/M2 | WEIGHT: 315 LBS | HEIGHT: 69 IN

## 2020-09-28 DIAGNOSIS — M17.11 PRIMARY OSTEOARTHRITIS OF RIGHT KNEE: Primary | ICD-10-CM

## 2020-09-28 PROCEDURE — 99203 OFFICE O/P NEW LOW 30 MIN: CPT | Performed by: ORTHOPAEDIC SURGERY

## 2020-09-28 RX ORDER — PIOGLITAZONEHYDROCHLORIDE 30 MG/1
TABLET ORAL
COMMUNITY
Start: 2020-07-13 | End: 2021-12-03

## 2020-09-28 RX ORDER — IBUPROFEN 800 MG/1
TABLET ORAL
COMMUNITY
Start: 2020-09-21 | End: 2020-11-16

## 2020-09-28 NOTE — PROGRESS NOTES
Subjective: Right knee pain     Patient ID: Diego Ribeiro is a 60 y.o. male.    Chief Complaint:    History of Present Illness 60-year-old male is seen for his right knee which he injured at work [employer] on June 30 when he fell forward landed on all 4 extremities injuring the right knee.  Had minimal if any discomfort in the knee prior to this episode but since that time is progressively worsened causing increasing pain and discomfort.  Patient is tried physical therapy and has been taking ibuprofen 800 with marginal relief.  He describes a sharp pain that is anywhere from 2-5 out of 10 with activity.  An MRI was ordered which showed bilateral meniscal tears and degenerative changes in both compartments with an insufficiency fracture of the medial femoral condyle.       Social History     Occupational History   • Not on file   Tobacco Use   • Smoking status: Never Smoker   • Smokeless tobacco: Never Used   Substance and Sexual Activity   • Alcohol use: Yes     Comment: OCC   • Drug use: No   • Sexual activity: Defer      Review of Systems   Constitutional: Negative for chills, diaphoresis, fever and unexpected weight change.   HENT: Negative for hearing loss, nosebleeds, sore throat and tinnitus.    Eyes: Negative for pain and visual disturbance.   Respiratory: Negative for cough, shortness of breath and wheezing.    Cardiovascular: Negative for chest pain and palpitations.   Gastrointestinal: Negative for abdominal pain, diarrhea, nausea and vomiting.   Endocrine: Negative for cold intolerance, heat intolerance and polydipsia.   Genitourinary: Negative for difficulty urinating, dysuria and hematuria.   Musculoskeletal: Positive for myalgias. Negative for arthralgias and joint swelling.   Skin: Negative for rash and wound.   Allergic/Immunologic: Negative for environmental allergies.   Neurological: Negative for dizziness, syncope and numbness.   Hematological: Does not bruise/bleed easily.    Psychiatric/Behavioral: Negative for dysphoric mood and sleep disturbance. The patient is not nervous/anxious.          Past Medical History:   Diagnosis Date   • Diabetes (CMS/HCC)    • Hypertension      History reviewed. No pertinent surgical history.  Family History   Problem Relation Age of Onset   • Hypertension Mother    • Diabetes Mother    • Hypertension Father    • Diabetes Father          Objective:  Vitals:    09/28/20 1408   BP: 126/83   Pulse: 90         09/28/20  1408   Weight: (!) 147 kg (325 lb)     Body mass index is 47.99 kg/m².        Ortho Exam   I reviewed the MRI images and report and plain x-rays which show the degenerative changes.  He is alert and oriented x3.  Has normocephalic sclera.  Right he has a boggy synovitis.  He is 0 to 120 degrees of motion with moderate pain and crepitus with range of motion.  No instability at 09 degrees nor any varus or valgus instability at 10 to 30 degrees.  There is medial joint line tenderness with an equivocal medial Jessy's.  Minimal tenderness laterally.  Quad and hamstring function are 4 and half out of 5 secondary to pain.  His calf is nontender.  He has good distal pulses no motor or sensory deficit skin is cool to touch.    Assessment:        1. Primary osteoarthritis of right knee           Plan: Lengthy discussion with the patient regarding his history his x-rays and MRI and his physical findings.  He has pre-existing arthritis with the meniscal tears which I believe most likely were pre-existing but obviously there is no way to determine that completely.  He been on anti-inflammatories and physical therapy show no improvement.  Cortisone injections in the past of markedly elevated his blood sugar make it difficult to manage for several weeks following that injection.  My recommendation that first is to try the gel injections into the knee the think his pain is primarily coming from aggravation of the arthritis in the knee if that fails then  I would proceed with arthroscopic debridement but I like to try the gel first and bypassed a cortisone due to the adverse effect that has on his blood glucose level.  Return to proceed with the gel injections once authorization has been obtained.  Off work until seen            Work Status:    PR[facility] query complete.    Orders:  Orders Placed This Encounter   Procedures   • Visco Treatment       Medications:  No orders of the defined types were placed in this encounter.      Followup:  No follow-ups on file.          Dictated utilizing Dragon dictation

## 2020-10-22 ENCOUNTER — OFFICE VISIT (OUTPATIENT)
Dept: ORTHOPEDIC SURGERY | Facility: CLINIC | Age: 60
End: 2020-10-22

## 2020-10-22 VITALS — WEIGHT: 315 LBS | BODY MASS INDEX: 46.65 KG/M2 | HEIGHT: 69 IN

## 2020-10-22 DIAGNOSIS — M17.11 PRIMARY OSTEOARTHRITIS OF RIGHT KNEE: Primary | ICD-10-CM

## 2020-10-22 PROCEDURE — 99212 OFFICE O/P EST SF 10 MIN: CPT | Performed by: ORTHOPAEDIC SURGERY

## 2020-10-22 NOTE — PROGRESS NOTES
Subjective: Osteoarthritis right knee     Patient ID: Diego Ribeiro is a 60 y.o. male.    Chief Complaint:    History of Present Illness awaiting approval from Workmen's Comp. to proceed with viscous injection into the knee.  Still experiencing pain       Social History     Occupational History   • Not on file   Tobacco Use   • Smoking status: Never Smoker   • Smokeless tobacco: Never Used   Substance and Sexual Activity   • Alcohol use: Yes     Comment: OCC   • Drug use: No   • Sexual activity: Defer      Review of Systems   Constitutional: Negative for chills, diaphoresis, fever and unexpected weight change.   HENT: Negative for hearing loss, nosebleeds, sore throat and tinnitus.    Eyes: Negative for pain and visual disturbance.   Respiratory: Negative for cough, shortness of breath and wheezing.    Cardiovascular: Negative for chest pain and palpitations.   Gastrointestinal: Negative for abdominal pain, diarrhea, nausea and vomiting.   Endocrine: Negative for cold intolerance, heat intolerance and polydipsia.   Genitourinary: Negative for difficulty urinating, dysuria and hematuria.   Musculoskeletal: Positive for arthralgias and myalgias.   Skin: Negative for rash and wound.   Allergic/Immunologic: Negative for environmental allergies.   Neurological: Negative for dizziness, syncope and numbness.   Hematological: Does not bruise/bleed easily.   Psychiatric/Behavioral: Negative for dysphoric mood and sleep disturbance. The patient is not nervous/anxious.            Objective:      Ortho Exam   No change in his exam    Assessment:        1. Primary osteoarthritis of right knee           Plan: Off work until seen in 4 weeks.  Hopefully we will obtain authorization to proceed with the gel injection before that.  Again he has an osteoarthritic knee this been aggravated by the work-related injury I believe the gel is the best form of treatment at this point.  Return to complete the gel injection once approval has been  completed.                      Dictated utilizing Dragon dictation

## 2020-10-29 ENCOUNTER — TELEPHONE (OUTPATIENT)
Dept: ORTHOPEDIC SURGERY | Facility: CLINIC | Age: 60
End: 2020-10-29

## 2020-10-29 NOTE — TELEPHONE ENCOUNTER
DELETE AFTER REVIEWING: Telephone encounter to be sent to the clinical pool.    Caller: ERLINDA-COLLEEN COMP NURSE     Relationship: WORK COMP    Best call back number: [phone]    What form or medical record are you requesting: OFFICE NOTES FROM 10/22/20 AND ANY NEW ORDERS. WORK STATUS    Who is requesting this form or medical record from you: ERLINDA-NURSE  W/C    How would you like to receive the form or medical records (pick-up, mail, fax)  FAX #: [phone]    Timeframe paperwork needed: NONE STATED

## 2020-10-30 NOTE — TELEPHONE ENCOUNTER
ERLINDA- WORKERS [facility] NURSE COORDINATOR CALLING BACK TO REQ STATUS OF PREV CALL REGARDING NEXT STEP PLANS FOR PATIENT СВЕТЛАНА CHAMBERS: :1960.  SHE CALLED ON 10/29/20 & 10/30/2020.   PLEASE CALL ERLINDA .[phone]. THANK YOU.

## 2020-11-16 ENCOUNTER — OFFICE VISIT (OUTPATIENT)
Dept: ORTHOPEDIC SURGERY | Facility: CLINIC | Age: 60
End: 2020-11-16

## 2020-11-16 VITALS — WEIGHT: 315 LBS | BODY MASS INDEX: 46.65 KG/M2 | HEIGHT: 69 IN

## 2020-11-16 DIAGNOSIS — M17.11 PRIMARY OSTEOARTHRITIS OF RIGHT KNEE: Primary | ICD-10-CM

## 2020-11-16 PROCEDURE — 99213 OFFICE O/P EST LOW 20 MIN: CPT | Performed by: ORTHOPAEDIC SURGERY

## 2020-11-16 RX ORDER — MELOXICAM 15 MG/1
15 TABLET ORAL DAILY
Qty: 30 TABLET | Refills: 5 | Status: SHIPPED | OUTPATIENT
Start: 2020-11-16 | End: 2021-01-18 | Stop reason: SDUPTHER

## 2020-11-16 RX ORDER — METHYLPREDNISOLONE 4 MG/1
TABLET ORAL
Qty: 21 TABLET | Refills: 0 | Status: SHIPPED | OUTPATIENT
Start: 2020-11-16 | End: 2021-01-18 | Stop reason: SDUPTHER

## 2020-11-16 RX ORDER — HYALURONATE SOD, CROSS-LINKED 30 MG/3 ML
SYRINGE (ML) INTRAARTICULAR
COMMUNITY
Start: 2020-11-11 | End: 2021-12-03

## 2020-11-16 NOTE — PROGRESS NOTES
Subjective: Right knee pain     Patient ID: Diego Ribeiro is a 60 y.o. male.    Chief Complaint:    History of Present Illness Work[employer]'s Comp. denied the gel injection of the arthritis pre-existing.  Still having pain and discomfort.       Social History     Occupational History   • Not on file   Tobacco Use   • Smoking status: Never Smoker   • Smokeless tobacco: Never Used   Substance and Sexual Activity   • Alcohol use: Yes     Comment: OCC   • Drug use: No   • Sexual activity: Defer      Review of Systems   Constitutional: Negative for chills, diaphoresis, fever and unexpected weight change.   HENT: Negative for hearing loss, nosebleeds, sore throat and tinnitus.    Eyes: Negative for pain and visual disturbance.   Respiratory: Negative for cough, shortness of breath and wheezing.    Cardiovascular: Negative for chest pain and palpitations.   Gastrointestinal: Negative for abdominal pain, diarrhea, nausea and vomiting.   Endocrine: Negative for cold intolerance, heat intolerance and polydipsia.   Genitourinary: Negative for difficulty urinating, dysuria and hematuria.   Musculoskeletal: Positive for arthralgias and myalgias.   Skin: Negative for rash and wound.   Allergic/Immunologic: Negative for environmental allergies.   Neurological: Negative for dizziness, syncope and numbness.   Hematological: Does not bruise/bleed easily.   Psychiatric/Behavioral: Negative for dysphoric mood and sleep disturbance. The patient is not nervous/anxious.            Objective:      Ortho Exam   Is alert and oriented x3.  There is no swelling effusion erythema there is pain and crepitus with range of motion to 0 125 degrees.  No instability throughout the arc of motion.  Quad and hamstring function may 5/5 the calf remains nontender.  Good is a pulses no motor or sensory deficit.  Good capillary refill.  Tolerates Motrin when he takes it not no significant improvement.    Assessment:        1. Primary osteoarthritis of right knee            Plan: Reviewed treatment options with the patient at this time he did inquire about filing for the gel injection on his personal insurance but first I like to try him on a steroid pack followed by Motrin 15 mg a day.  Return in 3 weeks if not improved he can follow-up with peripheral insurance if he wants.  Off work until seen.  Answered all questions                      Dictated utilizing Dragon dictation

## 2020-12-07 ENCOUNTER — OFFICE VISIT (OUTPATIENT)
Dept: ORTHOPEDIC SURGERY | Facility: CLINIC | Age: 60
End: 2020-12-07

## 2020-12-07 VITALS — BODY MASS INDEX: 46.65 KG/M2 | WEIGHT: 315 LBS | HEIGHT: 69 IN

## 2020-12-07 DIAGNOSIS — M17.11 PRIMARY OSTEOARTHRITIS OF RIGHT KNEE: Primary | ICD-10-CM

## 2020-12-07 PROBLEM — I10 HYPERTENSION: Status: ACTIVE | Noted: 2020-12-07

## 2020-12-07 PROBLEM — E11.9 DIABETES MELLITUS (HCC): Status: ACTIVE | Noted: 2020-12-07

## 2020-12-07 PROBLEM — K21.9 GASTROESOPHAGEAL REFLUX DISEASE: Status: ACTIVE | Noted: 2020-12-07

## 2020-12-07 PROBLEM — I71.9 AORTIC ANEURYSM (HCC): Status: ACTIVE | Noted: 2020-12-07

## 2020-12-07 PROBLEM — M19.90 ARTHRITIS: Status: ACTIVE | Noted: 2020-12-07

## 2020-12-07 PROBLEM — M54.9 BACK PAIN: Status: ACTIVE | Noted: 2020-12-07

## 2020-12-07 PROBLEM — E78.00 HYPERCHOLESTEROLEMIA: Status: ACTIVE | Noted: 2020-12-07

## 2020-12-07 PROCEDURE — 99212 OFFICE O/P EST SF 10 MIN: CPT | Performed by: ORTHOPAEDIC SURGERY

## 2020-12-07 NOTE — PROGRESS NOTES
Subjective: Osteoarthritis right knee     Patient ID: Diego Ribeiro is a 60 y.o. male.    Chief Complaint:    History of Present Illness 60-year-old male returns with persistent pain discomfort in the knee although he does state it is slightly better described a constant ache now 1 or 2.  His personal insurance required a $700 co-pay for co-pay for the gel injection..  Again is tried anti-inflammatories steroids and physical therapy.  At this point he wants to try to return to work       Social History     Occupational History   • Not on file   Tobacco Use   • Smoking status: Never Smoker   • Smokeless tobacco: Never Used   Substance and Sexual Activity   • Alcohol use: Yes     Comment: OCC   • Drug use: No   • Sexual activity: Defer      Review of Systems   Constitutional: Negative for chills, diaphoresis, fever and unexpected weight change.   HENT: Negative for hearing loss, nosebleeds, sore throat and tinnitus.    Eyes: Negative for pain and visual disturbance.   Respiratory: Negative for cough, shortness of breath and wheezing.    Cardiovascular: Negative for chest pain and palpitations.   Gastrointestinal: Negative for abdominal pain, diarrhea, nausea and vomiting.   Endocrine: Negative for cold intolerance, heat intolerance and polydipsia.   Genitourinary: Negative for difficulty urinating, dysuria and hematuria.   Musculoskeletal: Positive for arthralgias and myalgias.   Skin: Negative for rash and wound.   Allergic/Immunologic: Negative for environmental allergies.   Neurological: Negative for dizziness, syncope and numbness.   Hematological: Does not bruise/bleed easily.   Psychiatric/Behavioral: Negative for dysphoric mood and sleep disturbance. The patient is not nervous/anxious.            Objective:       Ortho Exam   Is alert and oriented x3.  Again he has a knee sleeve on the there is pain and crepitus but there is no instability to the knee.  No effusion.  Calf is nontender good distal pulses no motor  or sensory deficit.    Assessment:        1. Primary osteoarthritis of right knee           Plan: At this point he has no option but to return to work.  Return to work on 13 December with no restrictions.  Continue the anti-inflammatories.  Return in 6 weeks.  If he is experiencing significant pain we can try cortisone injection in the knee at that time.                      Dictated utilizing Dragon dictation

## 2021-01-18 ENCOUNTER — OFFICE VISIT (OUTPATIENT)
Dept: ORTHOPEDIC SURGERY | Facility: CLINIC | Age: 61
End: 2021-01-18

## 2021-01-18 VITALS — HEIGHT: 69 IN | WEIGHT: 315 LBS | BODY MASS INDEX: 46.65 KG/M2

## 2021-01-18 DIAGNOSIS — M17.11 PRIMARY OSTEOARTHRITIS OF RIGHT KNEE: Primary | ICD-10-CM

## 2021-01-18 PROCEDURE — 20610 DRAIN/INJ JOINT/BURSA W/O US: CPT | Performed by: ORTHOPAEDIC SURGERY

## 2021-01-18 PROCEDURE — 99213 OFFICE O/P EST LOW 20 MIN: CPT | Performed by: ORTHOPAEDIC SURGERY

## 2021-01-18 RX ORDER — LIDOCAINE HYDROCHLORIDE 10 MG/ML
4 INJECTION, SOLUTION EPIDURAL; INFILTRATION; INTRACAUDAL; PERINEURAL
Status: COMPLETED | OUTPATIENT
Start: 2021-01-18 | End: 2021-01-18

## 2021-01-18 RX ORDER — METHYLPREDNISOLONE 4 MG/1
TABLET ORAL
Qty: 21 TABLET | Refills: 0 | Status: SHIPPED | OUTPATIENT
Start: 2021-01-18 | End: 2021-01-18

## 2021-01-18 RX ORDER — TRIAMCINOLONE ACETONIDE 40 MG/ML
40 INJECTION, SUSPENSION INTRA-ARTICULAR; INTRAMUSCULAR
Status: COMPLETED | OUTPATIENT
Start: 2021-01-18 | End: 2021-01-18

## 2021-01-18 RX ORDER — MELOXICAM 15 MG/1
15 TABLET ORAL DAILY
Qty: 30 TABLET | Refills: 5 | Status: SHIPPED | OUTPATIENT
Start: 2021-01-18 | End: 2022-01-26

## 2021-01-18 RX ADMIN — TRIAMCINOLONE ACETONIDE 40 MG: 40 INJECTION, SUSPENSION INTRA-ARTICULAR; INTRAMUSCULAR at 11:27

## 2021-01-18 RX ADMIN — LIDOCAINE HYDROCHLORIDE 4 ML: 10 INJECTION, SOLUTION EPIDURAL; INFILTRATION; INTRACAUDAL; PERINEURAL at 11:27

## 2021-01-18 NOTE — PROGRESS NOTES
Subjective: Osteoarthritis right knee     Patient ID: Diego Ribeiro is a 60 y.o. male.    Chief Complaint:    History of Present Illness patient returns with the knee again still symptomatic causing moderate pain and discomfort.  Again he does do a lot of climbing of steps and is having moderate discomfort.  Workmen's Comp. would not cover the gel injections.  As discussed on previous visit we can proceed with a cortisone injection today.       Social History     Occupational History   • Not on file   Tobacco Use   • Smoking status: Never Smoker   • Smokeless tobacco: Never Used   Substance and Sexual Activity   • Alcohol use: Yes     Comment: OCC   • Drug use: No   • Sexual activity: Defer      Review of Systems   Musculoskeletal: Positive for arthralgias, gait problem and myalgias.         Past Medical History:   Diagnosis Date   • Diabetes (CMS/[facility])    • Hypertension      History reviewed. No pertinent surgical history.  Family History   Problem Relation Age of Onset   • Hypertension Mother    • Diabetes Mother    • Hypertension Father    • Diabetes Father          Objective:  There were no vitals filed for this visit.      01/18/21  1101   Weight: (!) 154 kg (340 lb)     Body mass index is 50.21 kg/m².        Ortho Exam   He is alert and oriented x3.  The right knee was injected 4 cc lidocaine 1 cc Celestone as he is diabetic.  Postinjection instructions particularly monitoring blood glucose given to the patient.  Continue the anti-inflammatory.     Assessment:               Plan: Return to see me as needed.  As discussed cortisone can be given every 3 to 4 months.  Answered all questions.  Again off work until Monday.  Large Joint Arthrocentesis: R knee  Date/Time: 1/18/2021 11:27 AM  Consent given by: patient  Site marked: site marked  Timeout: Immediately prior to procedure a time out was called to verify the correct patient, procedure, equipment, support staff and site/side marked as required   Supporting  Documentation  Indications: pain   Procedure Details  Location: knee - R knee  Preparation: Patient was prepped and draped in the usual sterile fashion  Needle size: 22 G  Approach: superior (lateral)  Medications administered: 4 mL lidocaine PF 1% 1 %; 40 mg triamcinolone acetonide 40 MG/ML  Patient tolerance: patient tolerated the procedure well with no immediate complications                  Work Status:    PRIMO query complete.    Orders:      Medications:      Followup:  Return in about 2 weeks (around 2/1/2021), or if symptoms worsen or fail to improve.        Dictated utilizing Dragon dictation

## 2021-01-19 ENCOUNTER — TELEPHONE (OUTPATIENT)
Dept: ORTHOPEDIC SURGERY | Facility: CLINIC | Age: 61
End: 2021-01-19

## 2021-05-20 ENCOUNTER — OFFICE VISIT (OUTPATIENT)
Dept: ORTHOPEDIC SURGERY | Facility: CLINIC | Age: 61
End: 2021-05-20

## 2021-05-20 VITALS — HEIGHT: 69 IN | BODY MASS INDEX: 46.65 KG/M2 | WEIGHT: 315 LBS

## 2021-05-20 DIAGNOSIS — M17.12 PRIMARY OSTEOARTHRITIS OF LEFT KNEE: ICD-10-CM

## 2021-05-20 DIAGNOSIS — M17.11 PRIMARY OSTEOARTHRITIS OF RIGHT KNEE: Primary | ICD-10-CM

## 2021-05-20 PROCEDURE — 99213 OFFICE O/P EST LOW 20 MIN: CPT | Performed by: ORTHOPAEDIC SURGERY

## 2021-05-20 PROCEDURE — 20610 DRAIN/INJ JOINT/BURSA W/O US: CPT | Performed by: ORTHOPAEDIC SURGERY

## 2021-05-20 RX ORDER — ATORVASTATIN CALCIUM 10 MG/1
TABLET, FILM COATED ORAL
COMMUNITY
Start: 2021-05-15

## 2021-05-20 RX ORDER — BETAMETHASONE SODIUM PHOSPHATE AND BETAMETHASONE ACETATE 3; 3 MG/ML; MG/ML
6 INJECTION, SUSPENSION INTRA-ARTICULAR; INTRALESIONAL; INTRAMUSCULAR; SOFT TISSUE
Status: COMPLETED | OUTPATIENT
Start: 2021-05-20 | End: 2021-05-20

## 2021-05-20 RX ORDER — FUROSEMIDE 20 MG/1
TABLET ORAL
COMMUNITY
Start: 2021-05-11

## 2021-05-20 RX ORDER — LIDOCAINE HYDROCHLORIDE 10 MG/ML
8 INJECTION, SOLUTION EPIDURAL; INFILTRATION; INTRACAUDAL; PERINEURAL
Status: COMPLETED | OUTPATIENT
Start: 2021-05-20 | End: 2021-05-20

## 2021-05-20 RX ADMIN — BETAMETHASONE SODIUM PHOSPHATE AND BETAMETHASONE ACETATE 6 MG: 3; 3 INJECTION, SUSPENSION INTRA-ARTICULAR; INTRALESIONAL; INTRAMUSCULAR; SOFT TISSUE at 15:09

## 2021-05-20 RX ADMIN — LIDOCAINE HYDROCHLORIDE 8 ML: 10 INJECTION, SOLUTION EPIDURAL; INFILTRATION; INTRACAUDAL; PERINEURAL at 15:09

## 2021-05-20 NOTE — PROGRESS NOTES
Subjective: Bilateral knee pain right worse than left     Patient ID: Diego Ribeiro is a 60 y.o. male.    Chief Complaint:    History of Present Illness 60-year-old male known to me returns.  Was seen in January and had a right knee injection did well to just recently and now both knees very symptomatic causing moderate pain and discomfort with any all activities.  Has been taking meloxicam without relief of his symptoms.       Social History     Occupational History   • Not on file   Tobacco Use   • Smoking status: Never Smoker   • Smokeless tobacco: Never Used   Substance and Sexual Activity   • Alcohol use: Yes     Comment: OCC   • Drug use: No   • Sexual activity: Defer      Review of Systems   Constitutional: Negative for chills, diaphoresis, fever and unexpected weight change.   HENT: Negative for hearing loss, nosebleeds, sore throat and tinnitus.    Eyes: Negative for pain and visual disturbance.   Respiratory: Negative for cough, shortness of breath and wheezing.    Cardiovascular: Negative for chest pain and palpitations.   Gastrointestinal: Negative for abdominal pain, diarrhea, nausea and vomiting.   Endocrine: Negative for cold intolerance, heat intolerance and polydipsia.   Genitourinary: Negative for difficulty urinating, dysuria and hematuria.   Musculoskeletal: Positive for arthralgias.   Skin: Negative for rash and wound.   Allergic/Immunologic: Negative for environmental allergies.   Neurological: Negative for dizziness, syncope and numbness.   Hematological: Does not bruise/bleed easily.   Psychiatric/Behavioral: Negative for dysphoric mood and sleep disturbance. The patient is not nervous/anxious.    All other systems reviewed and are negative.        Past Medical History:   Diagnosis Date   • Diabetes (CMS/HCC)    • Hypertension      No past surgical history on file.  Family History   Problem Relation Age of Onset   • Hypertension Mother    • Diabetes Mother    • Hypertension Father    •  Diabetes Father          Objective:  There were no vitals filed for this visit.      05/20/21  1500   Weight: (!) 154 kg (340 lb)     Body mass index is 50.19 kg/m².        Ortho Exam   He is alert and oriented x3.  Effusion of the knee swelling effusion erythema but there is moderate crepitus with range of motion to both knees but no instability.  Good distal pulses no motor or sensory deficit.  His calf is nontender.  Quad function is 5/5.  There is joint line tenderness with negative Jessy's.  Is good distal pulses.  Denies any neuropathy.  Skin is cool to touch.  Is tolerating meloxicam.    Assessment:        1. Primary osteoarthritis of right knee    2. Primary osteoarthritis of left knee           Plan: Reviewed the patient's history and physical exam.  Again he has DJD of both knees the right is most symptomatic.  He tolerated a cortisone injection minimal side effects back in January , so after reviewing treatment options I am to proceed with bilateral cortisone injections of lidocaine Celestone at a ratio of 4:1.  Postinjection instructions given to the patient particularly monitor blood glucose level.  Was told to contact his primary care physician if he begins to feel poorly.  Again his Workmen's Comp. and his primary care insurance would not pay for gel injections required him to pay over $700 which he cannot afford at this time.  Return to see me as needed.  Answered all questions      Large Joint Arthrocentesis  Date/Time: 5/20/2021 3:09 PM  Consent given by: patient  Site marked: site marked  Timeout: Immediately prior to procedure a time out was called to verify the correct patient, procedure, equipment, support staff and site/side marked as required   Supporting Documentation  Indications: pain   Procedure Details  Location: knee - Knee joint: bilateral.  Preparation: Patient was prepped and draped in the usual sterile fashion  Needle size: 22 G  Approach: superior  Medications administered: 8 mL  lidocaine PF 1% 1 %; 6 mg betamethasone acetate-betamethasone sodium phosphate 6 (3-3) MG/ML  Patient tolerance: patient tolerated the procedure well with no immediate complications            Work Status:    PRIMO query complete.    Orders:  Orders Placed This Encounter   Procedures   • Large Joint Arthrocentesis       Medications:  No orders of the defined types were placed in this encounter.      Followup:  Return if symptoms worsen or fail to improve.          Dictated utilizing Dragon dictation

## 2021-12-10 DIAGNOSIS — Z79.1 ENCOUNTER FOR MONITORING NSAID THERAPY: Primary | ICD-10-CM

## 2021-12-10 DIAGNOSIS — Z51.81 ENCOUNTER FOR MONITORING NSAID THERAPY: Primary | ICD-10-CM

## 2022-01-26 ENCOUNTER — TELEPHONE (OUTPATIENT)
Dept: FAMILY MEDICINE CLINIC | Facility: CLINIC | Age: 62
End: 2022-01-26

## 2022-01-26 ENCOUNTER — OFFICE VISIT (OUTPATIENT)
Dept: FAMILY MEDICINE CLINIC | Facility: CLINIC | Age: 62
End: 2022-01-26

## 2022-01-26 VITALS
TEMPERATURE: 96.8 F | SYSTOLIC BLOOD PRESSURE: 142 MMHG | BODY MASS INDEX: 46.65 KG/M2 | OXYGEN SATURATION: 99 % | HEART RATE: 95 BPM | HEIGHT: 69 IN | DIASTOLIC BLOOD PRESSURE: 92 MMHG | WEIGHT: 315 LBS

## 2022-01-26 DIAGNOSIS — M51.36 DISC DEGENERATION, LUMBAR: ICD-10-CM

## 2022-01-26 DIAGNOSIS — I10 PRIMARY HYPERTENSION: ICD-10-CM

## 2022-01-26 DIAGNOSIS — M54.42 LEFT-SIDED LOW BACK PAIN WITH LEFT-SIDED SCIATICA, UNSPECIFIED CHRONICITY: ICD-10-CM

## 2022-01-26 DIAGNOSIS — E78.00 HYPERCHOLESTEROLEMIA: ICD-10-CM

## 2022-01-26 DIAGNOSIS — I71.9 AORTIC ANEURYSM WITHOUT RUPTURE, UNSPECIFIED PORTION OF AORTA: ICD-10-CM

## 2022-01-26 DIAGNOSIS — M19.90 ARTHRITIS: ICD-10-CM

## 2022-01-26 DIAGNOSIS — K21.9 GASTROESOPHAGEAL REFLUX DISEASE WITHOUT ESOPHAGITIS: ICD-10-CM

## 2022-01-26 DIAGNOSIS — E11.65 TYPE 2 DIABETES MELLITUS WITH HYPERGLYCEMIA, WITHOUT LONG-TERM CURRENT USE OF INSULIN: Primary | ICD-10-CM

## 2022-01-26 DIAGNOSIS — M51.36 DISC DEGENERATION, LUMBAR: Primary | ICD-10-CM

## 2022-01-26 LAB
EXPIRATION DATE: ABNORMAL
HBA1C MFR BLD: 7.2 %
Lab: ABNORMAL
POC CREATININE URINE: 50
POC MICROALBUMIN URINE: 10

## 2022-01-26 PROCEDURE — 82044 UR ALBUMIN SEMIQUANTITATIVE: CPT | Performed by: NURSE PRACTITIONER

## 2022-01-26 PROCEDURE — 83036 HEMOGLOBIN GLYCOSYLATED A1C: CPT | Performed by: NURSE PRACTITIONER

## 2022-01-26 PROCEDURE — 99213 OFFICE O/P EST LOW 20 MIN: CPT | Performed by: NURSE PRACTITIONER

## 2022-01-26 RX ORDER — METAXALONE 800 MG/1
800 TABLET ORAL 3 TIMES DAILY PRN
Qty: 60 TABLET | Refills: 0 | Status: SHIPPED | OUTPATIENT
Start: 2022-01-26

## 2022-01-26 RX ORDER — GINKGO BILOBA LEAF EXTRACT 60 MG
CAPSULE ORAL
COMMUNITY

## 2022-01-26 RX ORDER — CHOLECALCIFEROL (VITAMIN D3) 125 MCG
5 CAPSULE ORAL
COMMUNITY

## 2022-01-26 RX ORDER — DICLOFENAC SODIUM 75 MG/1
75 TABLET, DELAYED RELEASE ORAL 2 TIMES DAILY
Qty: 60 TABLET | Refills: 0 | Status: SHIPPED | OUTPATIENT
Start: 2022-01-26
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
## 2022-01-26 NOTE — TELEPHONE ENCOUNTER
----- Message from JANIA Mckenzie sent at 1/26/2022  8:37 AM EST -----  Please obtain records for lumbar xray from Dr. Sylvia Hutchinson, Chiropractory

## 2022-01-26 NOTE — TELEPHONE ENCOUNTER
----- Message from JANIA Mckenzie sent at 1/26/2022 11:00 AM EST -----  Please let patient know that I reviewed x-ray from chiropractor.  Based on findings I am going to order MRI.  Please see where patient would like to have that I will get ordered and will get set up as soon as approved with insurance.

## 2022-02-09 ENCOUNTER — OFFICE VISIT (OUTPATIENT)
Dept: FAMILY MEDICINE CLINIC | Facility: CLINIC | Age: 62
End: 2022-02-09

## 2022-02-09 VITALS
DIASTOLIC BLOOD PRESSURE: 68 MMHG | TEMPERATURE: 97.1 F | SYSTOLIC BLOOD PRESSURE: 156 MMHG | BODY MASS INDEX: 46.65 KG/M2 | OXYGEN SATURATION: 98 % | HEART RATE: 96 BPM | WEIGHT: 315 LBS | HEIGHT: 69 IN

## 2022-02-09 DIAGNOSIS — M51.36 DISC DEGENERATION, LUMBAR: Primary | ICD-10-CM

## 2022-02-09 PROCEDURE — 99213 OFFICE O/P EST LOW 20 MIN: CPT | Performed by: FAMILY MEDICINE

## 2022-02-09 RX ORDER — LIDOCAINE 50 MG/G
1 PATCH TOPICAL EVERY 24 HOURS
Qty: 30 EACH | Refills: 1 | Status: SHIPPED | OUTPATIENT
Start: 2022-02-09

## 2022-02-09 NOTE — PROGRESS NOTES
"Chief Complaint  Back Pain (down into leg pain  MRI 02/15)    Subjective          Diego Ribeiro presents to [facility]  Patient of Jenaro Umana's is here today for back pain.  This has been ongoing for him for the past couple of months.  He has seen his prior PCP, Jenaro, a chiropractor and also been seen in urgent care.  He has had a round of steroids recently and been prescribed anti-inflammatories by Jenaro a couple weeks ago and a muscle relaxer.  He had x-rays at the chiropractor and an order for an MRI was scheduled for next week.  His back pain persists in spite of medications.  He has not used ice and has not performed stretches as advised by Jenaro last week.  He denies numbness or tingling in LE but states the pain is now on both sides but worse on the Left side.  Patient denies any saddle numbness, urinary or bowel incontinence.  He continues to work in a longterm.  He is not required to lift objects or more do much more than walk while on the job.      Objective   Vital Signs:   /68   Pulse 96   Temp 97.1 °F (36.2 °C)   Ht 175.3 cm (69\")   Wt (!) 148 kg (326 lb 3.2 oz)   SpO2 98%   BMI 48.17 kg/m²     Physical Exam  Vitals and nursing note reviewed.   Constitutional:       Appearance: Normal appearance. He is well-developed. He is obese.   HENT:      Head: Normocephalic and atraumatic.   Eyes:      Conjunctiva/sclera: Conjunctivae normal.   Pulmonary:      Effort: Pulmonary effort is normal.   Musculoskeletal:         General: Normal range of motion.      Cervical back: Normal range of motion and neck supple.   Skin:     General: Skin is warm and dry.      Capillary Refill: Capillary refill takes less than 2 seconds.      Findings: No rash.   Neurological:      Mental Status: He is alert and oriented to person, place, and time.   Psychiatric:         Mood and Affect: Mood normal.         Behavior: Behavior normal.         Thought Content: Thought content normal.         " Judgment: Judgment normal.        Result Review :   The following data was reviewed by: Denia Hudson DO on 02/09/2022:  Common labs    Common Labsle 1/26/22   Hemoglobin A1C 7.2                         Assessment and Plan    Diagnoses and all orders for this visit:    1. Disc degeneration, lumbar (Primary)  -     lidocaine (LIDODERM) 5 %; Place 1 patch on the skin as directed by provider Daily. Remove & Discard patch within 12 hours or as directed by MD  Dispense: 30 each; Refill: 1    Patient of Jenaro Umana was seen today for persistent low back pain.  I advised that he could continue his current duties at work as long as he does not have to lift weight, reach overhead frequently, or bend or stoop frequently.  I have given him a topical pain patch to help with his pain.  The patient was encouraged once again to perform stretching exercises and use ice frequently.  He should follow-up for worsening symptoms.      Follow Up   No follow-ups on file.  Patient was given instructions and counseling regarding his condition or for health maintenance advice. Please see specific information pulled into the AVS if appropriate.

## 2022-02-10 ENCOUNTER — TELEPHONE (OUTPATIENT)
Dept: FAMILY MEDICINE CLINIC | Facility: CLINIC | Age: 62
End: 2022-02-10

## 2022-03-02 ENCOUNTER — HOSPITAL ENCOUNTER (OUTPATIENT)
Dept: MRI IMAGING | Facility: HOSPITAL | Age: 62
Discharge: HOME | End: 2022-03-02
Admitting: NURSE PRACTITIONER

## 2022-03-02 DIAGNOSIS — M54.42 LEFT-SIDED LOW BACK PAIN WITH LEFT-SIDED SCIATICA, UNSPECIFIED CHRONICITY: ICD-10-CM

## 2022-03-02 DIAGNOSIS — M51.36 DISC DEGENERATION, LUMBAR: ICD-10-CM

## 2022-03-02 PROCEDURE — 72148 MRI LUMBAR SPINE W/O DYE: CPT

## 2022-03-03 DIAGNOSIS — M51.36 DISC DEGENERATION, LUMBAR: Primary | ICD-10-CM

## 2022-03-03 NOTE — PROGRESS NOTES
Please call patient with results of MRI.  At L3-L4 there is mild to space narrowing, degenerative changes, disc bulge and change of the new nerve root.  At L4-L5 there is same findings.  Would he like to proceed with referral to pain management or see neurosurgery?  Please let me know and I am happy to make referral to either

## 2022-03-17 ENCOUNTER — TELEPHONE (OUTPATIENT)
Dept: FAMILY MEDICINE CLINIC | Facility: CLINIC | Age: 62
End: 2022-03-17

## 2022-03-17 NOTE — TELEPHONE ENCOUNTER
Can you check on this   the referral does say [facility] but has not been scheduled.  It was from 3/3/22

## 2022-03-17 NOTE — TELEPHONE ENCOUNTER
I SPOKE WITH [facility] AND THEY SAID THEY DID RECEIVE HIS INFORMATION ON 03/08/22. IT IS STILL IN REVIEW. THE DR IS REVIEWING IT TO SEE IF HE IS AN APPROPRIATE PATIENT FOR THEM.

## 2022-03-17 NOTE — TELEPHONE ENCOUNTER
Caller: Diego Ribeiro    Relationship: Self    Best call back number: [phone]    What is the best time to reach you: ANY    Who are you requesting to speak with (clinical staff, provider, specific staff member): CLINICAL     What was the call regarding: PT WANTS TO KNOW IF HIS REFERRAL HAS BEEN CHANGED TO [facility]. STATED THAT THEY TOLD HIM THEY HAVEN'T RECEIVED ANYTHING.     Do you require a callback: YES